# Patient Record
Sex: FEMALE | Race: BLACK OR AFRICAN AMERICAN | NOT HISPANIC OR LATINO | URBAN - METROPOLITAN AREA
[De-identification: names, ages, dates, MRNs, and addresses within clinical notes are randomized per-mention and may not be internally consistent; named-entity substitution may affect disease eponyms.]

---

## 2023-09-24 ENCOUNTER — OUTPATIENT (OUTPATIENT)
Dept: OUTPATIENT SERVICES | Facility: HOSPITAL | Age: 38
LOS: 1 days | Discharge: HOME | End: 2023-09-24
Payer: SELF-PAY

## 2023-09-24 ENCOUNTER — EMERGENCY (EMERGENCY)
Facility: HOSPITAL | Age: 38
LOS: 1 days | Discharge: STILL ADMITTED OR ADMINISTRATIVE | End: 2023-09-24
Admitting: EMERGENCY MEDICINE
Payer: SELF-PAY

## 2023-09-24 VITALS
DIASTOLIC BLOOD PRESSURE: 72 MMHG | RESPIRATION RATE: 18 BRPM | SYSTOLIC BLOOD PRESSURE: 133 MMHG | TEMPERATURE: 99 F | OXYGEN SATURATION: 99 % | HEART RATE: 109 BPM

## 2023-09-24 VITALS
DIASTOLIC BLOOD PRESSURE: 67 MMHG | TEMPERATURE: 98 F | HEART RATE: 107 BPM | RESPIRATION RATE: 17 BRPM | SYSTOLIC BLOOD PRESSURE: 110 MMHG

## 2023-09-24 VITALS — SYSTOLIC BLOOD PRESSURE: 110 MMHG | HEART RATE: 91 BPM | DIASTOLIC BLOOD PRESSURE: 61 MMHG

## 2023-09-24 DIAGNOSIS — O26.899 OTHER SPECIFIED PREGNANCY RELATED CONDITIONS, UNSPECIFIED TRIMESTER: ICD-10-CM

## 2023-09-24 DIAGNOSIS — Z98.890 OTHER SPECIFIED POSTPROCEDURAL STATES: Chronic | ICD-10-CM

## 2023-09-24 DIAGNOSIS — Z98.891 HISTORY OF UTERINE SCAR FROM PREVIOUS SURGERY: Chronic | ICD-10-CM

## 2023-09-24 LAB
APPEARANCE UR: ABNORMAL
BACTERIA # UR AUTO: NEGATIVE /HPF — SIGNIFICANT CHANGE UP
BASOPHILS # BLD AUTO: 0.03 K/UL — SIGNIFICANT CHANGE UP (ref 0–0.2)
BASOPHILS NFR BLD AUTO: 0.3 % — SIGNIFICANT CHANGE UP (ref 0–2)
BILIRUB UR-MCNC: NEGATIVE — SIGNIFICANT CHANGE UP
BLD GP AB SCN SERPL QL: NEGATIVE — SIGNIFICANT CHANGE UP
CAST: 0 /LPF — SIGNIFICANT CHANGE UP (ref 0–4)
COLOR SPEC: YELLOW — SIGNIFICANT CHANGE UP
DIFF PNL FLD: NEGATIVE — SIGNIFICANT CHANGE UP
EOSINOPHIL # BLD AUTO: 0.12 K/UL — SIGNIFICANT CHANGE UP (ref 0–0.5)
EOSINOPHIL NFR BLD AUTO: 1.3 % — SIGNIFICANT CHANGE UP (ref 0–6)
FIBRINOGEN PPP-MCNC: 483 MG/DL — HIGH (ref 200–465)
GLUCOSE UR QL: NEGATIVE MG/DL — SIGNIFICANT CHANGE UP
HCT VFR BLD CALC: 27.4 % — LOW (ref 34.5–45)
HGB BLD-MCNC: 7.7 G/DL — LOW (ref 11.5–15.5)
IANC: 6.18 K/UL — SIGNIFICANT CHANGE UP (ref 1.8–7.4)
IMM GRANULOCYTES NFR BLD AUTO: 0.9 % — SIGNIFICANT CHANGE UP (ref 0–0.9)
KETONES UR-MCNC: NEGATIVE MG/DL — SIGNIFICANT CHANGE UP
LEUKOCYTE ESTERASE UR-ACNC: ABNORMAL
LYMPHOCYTES # BLD AUTO: 1.78 K/UL — SIGNIFICANT CHANGE UP (ref 1–3.3)
LYMPHOCYTES # BLD AUTO: 19.7 % — SIGNIFICANT CHANGE UP (ref 13–44)
MCHC RBC-ENTMCNC: 18.8 PG — LOW (ref 27–34)
MCHC RBC-ENTMCNC: 28.1 GM/DL — LOW (ref 32–36)
MCV RBC AUTO: 66.8 FL — LOW (ref 80–100)
MONOCYTES # BLD AUTO: 0.84 K/UL — SIGNIFICANT CHANGE UP (ref 0–0.9)
MONOCYTES NFR BLD AUTO: 9.3 % — SIGNIFICANT CHANGE UP (ref 2–14)
NEUTROPHILS # BLD AUTO: 6.18 K/UL — SIGNIFICANT CHANGE UP (ref 1.8–7.4)
NEUTROPHILS NFR BLD AUTO: 68.5 % — SIGNIFICANT CHANGE UP (ref 43–77)
NITRITE UR-MCNC: NEGATIVE — SIGNIFICANT CHANGE UP
NRBC # BLD: 0 /100 WBCS — SIGNIFICANT CHANGE UP (ref 0–0)
NRBC # FLD: 0 K/UL — SIGNIFICANT CHANGE UP (ref 0–0)
PH UR: 8 — SIGNIFICANT CHANGE UP (ref 5–8)
PLATELET # BLD AUTO: 307 K/UL — SIGNIFICANT CHANGE UP (ref 150–400)
PROT UR-MCNC: NEGATIVE MG/DL — SIGNIFICANT CHANGE UP
RBC # BLD: 4.1 M/UL — SIGNIFICANT CHANGE UP (ref 3.8–5.2)
RBC # FLD: 18.5 % — HIGH (ref 10.3–14.5)
RBC CASTS # UR COMP ASSIST: 1 /HPF — SIGNIFICANT CHANGE UP (ref 0–4)
REVIEW: SIGNIFICANT CHANGE UP
RH IG SCN BLD-IMP: POSITIVE — SIGNIFICANT CHANGE UP
SP GR SPEC: 1.01 — SIGNIFICANT CHANGE UP (ref 1–1.03)
SQUAMOUS # UR AUTO: 6 /HPF — HIGH (ref 0–5)
UROBILINOGEN FLD QL: 1 MG/DL — SIGNIFICANT CHANGE UP (ref 0.2–1)
WBC # BLD: 9.03 K/UL — SIGNIFICANT CHANGE UP (ref 3.8–10.5)
WBC # FLD AUTO: 9.03 K/UL — SIGNIFICANT CHANGE UP (ref 3.8–10.5)
WBC UR QL: 2 /HPF — SIGNIFICANT CHANGE UP (ref 0–5)

## 2023-09-24 PROCEDURE — 99221 1ST HOSP IP/OBS SF/LOW 40: CPT | Mod: 25

## 2023-09-24 PROCEDURE — L9996: CPT

## 2023-09-24 PROCEDURE — 59025 FETAL NON-STRESS TEST: CPT | Mod: 26

## 2023-09-24 NOTE — OB PROVIDER TRIAGE NOTE - NSHPLABSRESULTS_GEN_ALL_CORE
7.7    9.03  )-----------( 307      ( 24 Sep 2023 16:49 )             27.4       Fibrinogen Clauss: 483 mg/dL     Urinalysis Basic - ( 24 Sep 2023 16:49 )    Color: Yellow / Appearance: Cloudy / S.012 / pH: x  Gluc: x / Ketone: Negative mg/dL  / Bili: Negative / Urobili: 1.0 mg/dL   Blood: x / Protein: Negative mg/dL / Nitrite: Negative   Leuk Esterase: Trace / RBC: 1 /HPF / WBC 2 /HPF   Sq Epi: x / Non Sq Epi: 6 /HPF / Bacteria: Negative /HPF    ABO Interpretation: O  Rh Interpretation: Positive  Antibody Screen: Negative

## 2023-09-24 NOTE — OB PROVIDER TRIAGE NOTE - NSOBPROVIDERNOTE_OBGYN_ALL_OB_FT
38 y.o  @ 32w4d presents for decreased fetal movement s/p fall, resolved. Hx of anemia and alpha thalassemia trait.    -Fetal status reassuring  -Patient feeling increased fetal movement  -H&H consistent with hx of anemia  -Discussed with Dr. Da Silva. Patient to be discharged to home and will follow up with her OB Dr. Dorita Barger at [facility] at next scheduled appointment.   -Written and verbal discharge instructions provided to patient. Instructed to report to [facility] hospital if experiencing cramping, contractions, leaking of vaginal fluid, vaginal bleeding, or a decrease/absence of fetal movement.    d/w Dr. Rekha CALLAWAY-BC 38 y.o  @ 32w4d presents for decreased fetal movement s/p fall at 9pm on 23, resolved. Hx of anemia and alpha thalassemia trait.    -Fetal status reassuring  -Patient feeling increased fetal movement  -H&H consistent with hx of anemia  -Discussed with Dr. Da Silva. Patient to be discharged to home and will follow up with her OB Dr. Dorita Barger at [facility] at next scheduled appointment.   -Written and verbal discharge instructions provided to patient. Instructed to report to [facility] hospital if experiencing cramping, contractions, leaking of vaginal fluid, vaginal bleeding, or a decrease/absence of fetal movement.  -Discharged at 18:20    d/w Dr. Rekha CALLAWAY-BC

## 2023-09-24 NOTE — ED ADULT TRIAGE NOTE - CHIEF COMPLAINT QUOTE
c/o slip and fall last night reports feels like baby is moving less today, 33 weeks pregnant, . denies abd cramping or vaginal bleeding,.

## 2023-09-24 NOTE — OB PROVIDER TRIAGE NOTE - NS_OBGYNHISTORY_OBGYN_ALL_OB_FT
C/S 2018 NRFHT, received blood transfusion intraop-8lb 8oz, GDMA1  C/S 2019 rpt-7lb uncomplicated  C/S 2020 rpt-7lb uncomplicated

## 2023-09-24 NOTE — OB RN TRIAGE NOTE - NSICDXPASTSURGICALHX_GEN_ALL_CORE_FT
PAST SURGICAL HISTORY:  H/O hernia repair      PAST SURGICAL HISTORY:  H/O  section     H/O hernia repair

## 2023-09-24 NOTE — OB PROVIDER TRIAGE NOTE - ADDITIONAL INSTRUCTIONS
Follow up with Dr. Dorita Barger of [facility] at your next scheduled prenatal appointment on 10/9/23. Continue to eat a regular diet and drink plenty of fluid. Report to [facility] hospital if you experience cramping, contractions, leaking of vaginal fluid, vaginal bleeding, or a decrease/absence of your baby's movements.

## 2023-09-24 NOTE — OB RN TRIAGE NOTE - FALL HARM RISK - RISK INTERVENTIONS

## 2023-09-24 NOTE — OB PROVIDER TRIAGE NOTE - NS_PHYSICALALLNEG_OBGYN_ALL_OB
Saumya Rios Fall Risk Assessment:     Last Known Fall: Within the last month  Mobility: Hemiplegic, paraplegia, or quadriplegia  Medications: Cardiovascular or central nervous system meds  Mental Status/LOC/Awareness: Lethargic/oriented to person only  Toileting Needs: Incontinence  Volume/Electrolyte Status: Use of IV fluids/tube feeds  Communication/Sensory: Non-English patient/unable to speak/slurred speech  Behavior: Appropriate behavior  Saumya Rios Fall Risk Total: 19  Fall Risk Level: HIGH RISK    Universal Fall Precautions:  bed in low position and locked, call light/belongings in reach, wheelchairs and assistive devices out of sight, siderails up x 2, use non-slip footwear, adequate lighting, educate on level of risk, clutter free and spill free environment, educate to call for assistance    Fall Risk Level Interventions:    TRIAL (InferX 8, NEURO, MED MICHAEL 5) Moderate Fall Risk Interventions  Place yellow fall risk ID band on patient: verified  Provide patient/family education based on risk assessment : verified  Educate patient/family to call staff for assistance when getting out of bed: verified  Place fall precaution signage outside patient door: verified  Utilize bed/chair fall alarm: verifiedTRIAL (InferX 8, NEURO, MED MICHAEL 5) High Fall Risk Interventions  Place yellow fall risk ID band on patient: completed  Provide patient/family education based on risk assessment: completed  Educate patient/family to call staff for assistance when getting out of bed: completed  Place fall precaution signage outside patient door: completed  Place patient in room close to nursing station: completed  Utilize bed/chair fall alarm: completed  Notify charge of high risk for huddle: completed    Patient Specific Interventions:     Medication: limit combination of prn medications  Mental Status/LOC/Awareness: check on patient hourly, utilize bed/chair fall alarm and reinforce the use of call light  Toileting: provide  frquent toileting and instruct patient/family on the use of grab bars  Volume/Electrolyte Status: ensure patient remains hydrated  Communication/Sensory: update plan of care on whiteboard  Behavioral: encourage patient to voice feelings and engage patient in daily activities  Mobility: utilize bed/chair fall alarm, ensure bed is locked and in lowest position and provide appropriate assistive device   All other review of systems negative, except as noted in HPI

## 2023-09-24 NOTE — OB PROVIDER TRIAGE NOTE - HISTORY OF PRESENT ILLNESS
38 y.o  @ 32w4d who receives prenatal care with Dr. Dorita Barger at [facility] presents to hospital with complaints of decreased amount of fetal movement today. Patient reports having a slip and fall on her buttocks last night at 9pm, and became concerned about the baby moving less today, but has felt increased fetal movement since arrival to [facility]. She reports mild back pain which she states is tolerable. Denies taking pain medication at home. Denies abdominal trauma, abdominal pain, tenderness, cramping, contractions, vaginal bleeding, or leaking of fluid.    Prenatal Course:  -Prenatal Care with Dr. Dorita Barger ([facility])  -Anemia--H&H 8.6/29.3 on 23 (viewed on patient electronic health portal); prescribed daily iron supplement on 23 per patient  -Alpha Thalassemia Carrier

## 2023-09-24 NOTE — OB PROVIDER TRIAGE NOTE - NSHPPHYSICALEXAM_GEN_ALL_CORE
T(C): 36.9 (09-24-23 @ 16:20), Max: 37.4 (09-24-23 @ 15:36)  HR: 106 (09-24-23 @ 16:20) (106 - 109)  BP: 112/60 (09-24-23 @ 16:20) (110/67 - 133/72)  RR: 16 (09-24-23 @ 16:20) (16 - 18)  SpO2: 99% (09-24-23 @ 15:36) (99% - 99%)    Physical Exam  Gen: NAD  Pulm: Unlabored  Abdomen: soft, nontender, gravid    Sono: cephalic, posterior placenta, EDWIN 12.09cm, BPP 8/8,  bpm, images saved in ASOB  NST: 155 bpm, moderate variability, +accels, no decels, no contractions, reactive NST

## 2023-09-27 DIAGNOSIS — Z3A.32 32 WEEKS GESTATION OF PREGNANCY: ICD-10-CM

## 2023-09-27 DIAGNOSIS — O36.8130 DECREASED FETAL MOVEMENTS, THIRD TRIMESTER, NOT APPLICABLE OR UNSPECIFIED: ICD-10-CM

## 2023-09-27 DIAGNOSIS — M54.9 DORSALGIA, UNSPECIFIED: ICD-10-CM

## 2023-09-27 DIAGNOSIS — O99.891 OTHER SPECIFIED DISEASES AND CONDITIONS COMPLICATING PREGNANCY: ICD-10-CM

## 2023-09-27 DIAGNOSIS — O09.523 SUPERVISION OF ELDERLY MULTIGRAVIDA, THIRD TRIMESTER: ICD-10-CM

## 2024-05-29 ENCOUNTER — EMERGENCY (EMERGENCY)
Facility: HOSPITAL | Age: 39
LOS: 1 days | Discharge: HOME | End: 2024-05-29
Admitting: STUDENT IN AN ORGANIZED HEALTH CARE EDUCATION/TRAINING PROGRAM
Payer: COMMERCIAL

## 2024-05-29 VITALS
DIASTOLIC BLOOD PRESSURE: 76 MMHG | RESPIRATION RATE: 17 BRPM | HEART RATE: 94 BPM | OXYGEN SATURATION: 100 % | SYSTOLIC BLOOD PRESSURE: 113 MMHG | TEMPERATURE: 98 F

## 2024-05-29 DIAGNOSIS — Z98.890 OTHER SPECIFIED POSTPROCEDURAL STATES: Chronic | ICD-10-CM

## 2024-05-29 DIAGNOSIS — Z98.891 HISTORY OF UTERINE SCAR FROM PREVIOUS SURGERY: Chronic | ICD-10-CM

## 2024-05-29 PROCEDURE — 93010 ELECTROCARDIOGRAM REPORT: CPT

## 2024-05-29 PROCEDURE — 99283 EMERGENCY DEPT VISIT LOW MDM: CPT

## 2024-05-29 NOTE — ED ADULT TRIAGE NOTE - CHIEF COMPLAINT QUOTE
Pt reporting to the ED from urgent care for elevated HR, HR normal in triage. + strep test today. Reporting sore throat, fever, body aches. No chest pain or SOB.

## 2024-05-30 PROBLEM — D64.9 ANEMIA, UNSPECIFIED: Chronic | Status: ACTIVE | Noted: 2023-09-24

## 2024-05-30 PROBLEM — D56.3 THALASSEMIA MINOR: Chronic | Status: ACTIVE | Noted: 2023-09-24

## 2024-05-30 RX ORDER — ACETAMINOPHEN 500 MG
975 TABLET ORAL ONCE
Refills: 0 | Status: COMPLETED | OUTPATIENT
Start: 2024-05-30 | End: 2024-05-30

## 2024-05-30 RX ORDER — DEXAMETHASONE 0.5 MG/5ML
5 ELIXIR ORAL ONCE
Refills: 0 | Status: COMPLETED | OUTPATIENT
Start: 2024-05-30 | End: 2024-05-30

## 2024-05-30 RX ADMIN — Medication 5 MILLIGRAM(S): at 00:24

## 2024-05-30 RX ADMIN — Medication 975 MILLIGRAM(S): at 00:24

## 2024-05-30 NOTE — ED PROVIDER NOTE - OBJECTIVE STATEMENT
38 y/o female with no significant PMHx presents to the ED after being seen at urgent care. Patient was evaluated and ws diagnosed with strep throat in which she prescribed augmentin. Urgent care sent patient here for IVF, but patient is able to tolerate PO. Patient states that before leaving urgent care she drank Gatorade and water. Patient reports improvement of symptoms while she was at urgent care at this time. While in triage patient is afebrible and not tachycardic at this time. She denies chest pain, shortness of breath, diffculty breathing, fever, chills, n/v, or any other concerning symptoms at this time.

## 2024-05-30 NOTE — ED PROVIDER NOTE - CLINICAL SUMMARY MEDICAL DECISION MAKING FREE TEXT BOX
40 y/o female with no significant PMHx presents to the ED after being seen at urgent care. Patient was evaluated and ws diagnosed with strep throat in which she prescribed augmentin. Urgent care sent patient here for IVF, but patient is able to tolerate PO. Patient states that before leaving urgent care she drank Gatorade and water. Patient reports improvement of symptoms while she was at urgent care at this time. While in triage patient is afebrible and not tachycardic at this time. She denies chest pain, shortness of breath, difficulty breathing, fever, chills, n/v, or any other concerning symptoms at this time.    Physical exam as above.     Impression: strep throat    Plan:    decadron    tylenol

## 2024-05-30 NOTE — ED ADULT NURSE NOTE - NSFALLUNIVINTERV_ED_ALL_ED
Bed/Stretcher in lowest position, wheels locked, appropriate side rails in place/Call bell, personal items and telephone in reach/Instruct patient to call for assistance before getting out of bed/chair/stretcher/Non-slip footwear applied when patient is off stretcher/[place] to call system/Physically safe environment - no spills, clutter or unnecessary equipment/Purposeful proactive rounding/Room/bathroom lighting operational, light cord in reach

## 2024-05-30 NOTE — ED PROVIDER NOTE - NSFOLLOWUPINSTRUCTIONS_ED_ALL_ED_FT
You were seen in our department for Tonsillitis   Follow up with your Primary  in 48-72 hours for further monitoring.      if you develop any chest pain, dizziness, high fevers, weakness, numbness, tingling, vision changes, or any worsening symptoms return to our ED for evaluation.

## 2024-05-30 NOTE — ED PROVIDER NOTE - PATIENT PORTAL LINK FT
You can access the FollowMyHealth Patient Portal offered by [facility] by registering at the following website: [email or web address]. By joining [facility]’s FollowMyHealth portal, you will also be able to view your health information using other applications (apps) compatible with our system.

## 2024-05-30 NOTE — ED PROVIDER NOTE - PHYSICAL EXAMINATION
Vitals signed reviewed  General: Well appearing, NAD  HEENT: NC/AT, PERRLA, EOM intact with no pain, MMM    Throat: Enlarged tonsils noted with white exudates. Uvula midline    Neck: full ROM, no trachea deviation  Heart: RRR, normal s1/s2  Lungs: CTAB, normal WOB, no wheezing, rales, or rhonchi  Abdomen: Soft, non-distended, non-tender, no CVA tenderness , rebounding or guarding

## 2024-05-30 NOTE — ED ADULT NURSE NOTE - OBJECTIVE STATEMENT
Pt received in intake #7. Pt arrives from urgent care c/o elevated HR and + strep test today. Evaluated by provider. pt Medicated as ordered. safety maintained.

## 2024-09-27 NOTE — OB PROVIDER TRIAGE NOTE - NS_SONOPERFORMEDBY_OBGYN_ALL_OB_FT
Discharge instructions given. Mother states that her milk is in and her infant is breastfeeding well. Gave mother the information sheet about engorgement. Vahid states that she is starting to get engorged. Breastfeeding education sheet  called \"Breastfeeding Your Baby\" included in the discharge folder. Encouraged to watch the Discharge Video. Emphasized that mother should attempt to breastfeed at least every couple of hours with a minimum of 8 times with a goal of 10-12 feedings per 24 to establish and maintain adequate milk supply.  Infant nutrition and I&O discussed and mother encouraged to report her infant's I&O to the pediatrician.  If infant not latching, it's recommended she use a breastpump and pace feed any expressed breast milk, pasturized donor milk, or formula until infant can latch. Lactation number written on white board and encouraged to call if patient needs assistance or has any further questions. Mother given written instructions  on how to obtain an outpatient consultation.    Stephanie Wood NP